# Patient Record
Sex: MALE | Race: WHITE | Employment: STUDENT | URBAN - NONMETROPOLITAN AREA
[De-identification: names, ages, dates, MRNs, and addresses within clinical notes are randomized per-mention and may not be internally consistent; named-entity substitution may affect disease eponyms.]

---

## 2018-05-22 ENCOUNTER — OFFICE VISIT (OUTPATIENT)
Dept: FAMILY MEDICINE CLINIC | Facility: CLINIC | Age: 23
End: 2018-05-22

## 2018-05-22 VITALS
SYSTOLIC BLOOD PRESSURE: 112 MMHG | TEMPERATURE: 98 F | OXYGEN SATURATION: 98 % | BODY MASS INDEX: 27.47 KG/M2 | HEART RATE: 85 BPM | DIASTOLIC BLOOD PRESSURE: 84 MMHG | HEIGHT: 72.5 IN | WEIGHT: 205 LBS

## 2018-05-22 DIAGNOSIS — L23.7 ALLERGIC CONTACT DERMATITIS DUE TO PLANTS, EXCEPT FOOD: Primary | ICD-10-CM

## 2018-05-22 PROCEDURE — 99213 OFFICE O/P EST LOW 20 MIN: CPT | Performed by: FAMILY MEDICINE

## 2018-05-22 RX ORDER — PREDNISONE 20 MG/1
20 TABLET ORAL 2 TIMES DAILY
Qty: 14 TABLET | Refills: 0 | Status: SHIPPED | OUTPATIENT
Start: 2018-05-22 | End: 2018-05-29

## 2018-05-22 NOTE — PROGRESS NOTES
Krystyna Langston is a 26year old male. Patient presents with: Other: rash arms inrm 1      HPI:   After weed eating, patient developed a rash to both arms just on the sun exposed area. It is  vesicular but very minimally pruritic.     No current outpatien placed in this encounter. Meds & Refills for this Visit:  Signed Prescriptions Disp Refills    predniSONE 20 MG Oral Tab 14 tablet 0      Sig: Take 1 tablet (20 mg total) by mouth 2 (two) times daily.            Imaging & Consults:  None

## 2018-05-29 ENCOUNTER — OFFICE VISIT (OUTPATIENT)
Dept: FAMILY MEDICINE CLINIC | Facility: CLINIC | Age: 23
End: 2018-05-29

## 2018-05-29 VITALS
WEIGHT: 205.5 LBS | HEART RATE: 87 BPM | SYSTOLIC BLOOD PRESSURE: 118 MMHG | HEIGHT: 72.5 IN | BODY MASS INDEX: 27.53 KG/M2 | OXYGEN SATURATION: 97 % | TEMPERATURE: 98 F | DIASTOLIC BLOOD PRESSURE: 70 MMHG

## 2018-05-29 DIAGNOSIS — L08.9 PUSTULE: Primary | ICD-10-CM

## 2018-05-29 PROCEDURE — 87205 SMEAR GRAM STAIN: CPT | Performed by: FAMILY MEDICINE

## 2018-05-29 PROCEDURE — 87147 CULTURE TYPE IMMUNOLOGIC: CPT | Performed by: FAMILY MEDICINE

## 2018-05-29 PROCEDURE — 99213 OFFICE O/P EST LOW 20 MIN: CPT | Performed by: FAMILY MEDICINE

## 2018-05-29 PROCEDURE — 87186 SC STD MICRODIL/AGAR DIL: CPT | Performed by: FAMILY MEDICINE

## 2018-05-29 PROCEDURE — 87070 CULTURE OTHR SPECIMN AEROBIC: CPT | Performed by: FAMILY MEDICINE

## 2018-05-29 RX ORDER — SULFAMETHOXAZOLE AND TRIMETHOPRIM 800; 160 MG/1; MG/1
1 TABLET ORAL 2 TIMES DAILY
Qty: 28 TABLET | Refills: 0 | Status: SHIPPED | OUTPATIENT
Start: 2018-05-29 | End: 2020-07-23

## 2018-05-29 RX ORDER — MUPIROCIN CALCIUM 20 MG/G
1 CREAM TOPICAL 3 TIMES DAILY
Qty: 30 G | Refills: 1 | Status: SHIPPED | OUTPATIENT
Start: 2018-05-29 | End: 2018-05-30

## 2018-05-29 NOTE — PROGRESS NOTES
Angelica Mills is a 26year old male. Patient presents with: Other: rash - left leg  now. .. inrm 1      HPI:   Patient was seen last week with poison ivy. He is also had multiple small abrasions to his lower legs from weed eating.   Bowels are starting t Placed This Encounter      Aerobic Bacterial Culture [E]    Meds & Refills for this Visit:  Signed Prescriptions Disp Refills    Sulfamethoxazole-TMP DS (BACTRIM DS) 800-160 MG Oral Tab per tablet 28 tablet 0      Sig: Take 1 tablet by mouth 2 (two) times

## 2018-05-30 ENCOUNTER — TELEPHONE (OUTPATIENT)
Dept: FAMILY MEDICINE CLINIC | Facility: CLINIC | Age: 23
End: 2018-05-30

## 2018-05-30 NOTE — TELEPHONE ENCOUNTER
Pt needs to have script changed for the cream or ointment, whichever is cheapest. [facility] and what was sent over is over $400. Pt does not have insurance.

## 2018-08-02 ENCOUNTER — TELEPHONE (OUTPATIENT)
Dept: FAMILY MEDICINE CLINIC | Facility: CLINIC | Age: 23
End: 2018-08-02

## 2018-08-02 NOTE — TELEPHONE ENCOUNTER
HEADDING TO ER NOW IN [place], NEEDS NAME OF ANTIBIOTIC PRESCRIBED LAST TIME HE HAD MRSA   PLEASE CALL MOM JOHN

## 2018-08-02 NOTE — TELEPHONE ENCOUNTER
Mother informed pt had previously been on Bactrim DS and the culture from this same visit was sensitive to this anitbiotic. Denies further questions or concerns.

## 2018-08-02 NOTE — TELEPHONE ENCOUNTER
HX OF MRSA, HAS SOME SUSPICIOUS SORES ASKING IF DR [facility] OR ON CALL PROVIDER WOULD CALL IN ANTIBIOTICS WITHOUT APPOINTMENT SINCE HE IS IN [place] ON VACATION. MOM SAID THEY WILL HEAD OUT TO A WALK IN CLINIC IN [place] ALSO.  PLEASE ADVISE

## 2018-08-06 ENCOUNTER — OFFICE VISIT (OUTPATIENT)
Dept: FAMILY MEDICINE CLINIC | Facility: CLINIC | Age: 23
End: 2018-08-06

## 2018-08-06 VITALS
TEMPERATURE: 98 F | DIASTOLIC BLOOD PRESSURE: 80 MMHG | SYSTOLIC BLOOD PRESSURE: 126 MMHG | HEART RATE: 80 BPM | BODY MASS INDEX: 27 KG/M2 | WEIGHT: 201.63 LBS | RESPIRATION RATE: 16 BRPM

## 2018-08-06 DIAGNOSIS — Z02.9 ADMINISTRATIVE ENCOUNTER: Primary | ICD-10-CM

## 2018-08-07 NOTE — PROGRESS NOTES
Patient is here for his mom for swelling and redness of his left lower leg. States he was in [place] and got possible bug bites. States he has a history of MRSA. His leg has gotten more swollen and red since then. Swelling and redness are spreading.   H

## 2019-11-29 ENCOUNTER — TELEPHONE (OUTPATIENT)
Dept: FAMILY MEDICINE CLINIC | Facility: CLINIC | Age: 24
End: 2019-11-29

## 2019-11-29 NOTE — TELEPHONE ENCOUNTER
I called Tod Jordan and advised that he will need to see someone where he is at to confirm if it MRSA. Typically, a culture needs to be obtained. She was not happy- she said that he has been dealing with this for years and he knows what it is.  She was hoping

## 2019-11-29 NOTE — TELEPHONE ENCOUNTER
OUT OF STATE AND HE HAS ANOTHER SPOT POSSIBLY MRSA AGAIN?         HAS NOT BEEN SEEN SINCE AUGUST 2018. PLEASE ADVISE     ASKING FOR SCRIPT WITHOUT APPT.      [address] [facility] PH[facility] [phone]

## 2020-01-08 ENCOUNTER — OFFICE VISIT (OUTPATIENT)
Dept: FAMILY MEDICINE CLINIC | Facility: CLINIC | Age: 25
End: 2020-01-08
Payer: COMMERCIAL

## 2020-01-08 VITALS
SYSTOLIC BLOOD PRESSURE: 120 MMHG | RESPIRATION RATE: 16 BRPM | DIASTOLIC BLOOD PRESSURE: 80 MMHG | HEART RATE: 80 BPM | TEMPERATURE: 98 F | BODY MASS INDEX: 23.27 KG/M2 | WEIGHT: 179.38 LBS | HEIGHT: 73.5 IN | OXYGEN SATURATION: 98 %

## 2020-01-08 DIAGNOSIS — Z00.00 ROUTINE GENERAL MEDICAL EXAMINATION AT A HEALTH CARE FACILITY: Primary | ICD-10-CM

## 2020-01-08 DIAGNOSIS — Z02.1 PHYSICAL EXAM, PRE-EMPLOYMENT: ICD-10-CM

## 2020-01-08 LAB — INDURATION (): 0 MM (ref 0–11)

## 2020-01-08 PROCEDURE — 86580 TB INTRADERMAL TEST: CPT | Performed by: FAMILY MEDICINE

## 2020-01-08 PROCEDURE — 99395 PREV VISIT EST AGE 18-39: CPT | Performed by: FAMILY MEDICINE

## 2020-01-08 NOTE — PROGRESS NOTES
Clifford Venegas is a 27year old male. Patient presents with:  CPX: annual physical-needs statement of good health for job and TB test.... 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   No current outpatient medications on file prior or ordered in this encounter       Imaging & Consults:  None

## 2020-01-10 ENCOUNTER — NURSE ONLY (OUTPATIENT)
Dept: FAMILY MEDICINE CLINIC | Facility: CLINIC | Age: 25
End: 2020-01-10
Payer: COMMERCIAL

## 2020-01-10 DIAGNOSIS — Z11.1 ENCOUNTER FOR PPD SKIN TEST READING: Primary | ICD-10-CM

## 2020-07-23 ENCOUNTER — OFFICE VISIT (OUTPATIENT)
Dept: FAMILY MEDICINE CLINIC | Facility: CLINIC | Age: 25
End: 2020-07-23
Payer: COMMERCIAL

## 2020-07-23 VITALS
BODY MASS INDEX: 25.55 KG/M2 | HEIGHT: 73.5 IN | DIASTOLIC BLOOD PRESSURE: 68 MMHG | WEIGHT: 197 LBS | OXYGEN SATURATION: 99 % | SYSTOLIC BLOOD PRESSURE: 108 MMHG | HEART RATE: 80 BPM | RESPIRATION RATE: 16 BRPM | TEMPERATURE: 94 F

## 2020-07-23 DIAGNOSIS — L70.0 ACNE VULGARIS: ICD-10-CM

## 2020-07-23 DIAGNOSIS — L08.9 SKIN PUSTULE: Primary | ICD-10-CM

## 2020-07-23 PROCEDURE — 3008F BODY MASS INDEX DOCD: CPT | Performed by: NURSE PRACTITIONER

## 2020-07-23 PROCEDURE — 87205 SMEAR GRAM STAIN: CPT | Performed by: NURSE PRACTITIONER

## 2020-07-23 PROCEDURE — 3078F DIAST BP <80 MM HG: CPT | Performed by: NURSE PRACTITIONER

## 2020-07-23 PROCEDURE — 87077 CULTURE AEROBIC IDENTIFY: CPT | Performed by: NURSE PRACTITIONER

## 2020-07-23 PROCEDURE — 99213 OFFICE O/P EST LOW 20 MIN: CPT | Performed by: NURSE PRACTITIONER

## 2020-07-23 PROCEDURE — 87070 CULTURE OTHR SPECIMN AEROBIC: CPT | Performed by: NURSE PRACTITIONER

## 2020-07-23 PROCEDURE — 3074F SYST BP LT 130 MM HG: CPT | Performed by: NURSE PRACTITIONER

## 2020-07-23 RX ORDER — SULFAMETHOXAZOLE AND TRIMETHOPRIM 800; 160 MG/1; MG/1
1 TABLET ORAL 2 TIMES DAILY
Qty: 28 TABLET | Refills: 0 | Status: SHIPPED | OUTPATIENT
Start: 2020-07-23 | End: 2020-08-06

## 2020-07-23 NOTE — PROGRESS NOTES
HPI:   Patient presents for possible MRSA. States he gets these lesions about the same time every year. Noticed some redness and irritation on his back this morning. Otherwise is feeling well.        Current Outpatient Medications   Medication Sig Dispens Diagnoses and all orders for this visit:    Skin pustule  -     AEROBIC BACTERIAL CULTURE; Future  -     Sulfamethoxazole-TMP DS (BACTRIM DS) 800-160 MG Oral Tab per tablet;  Take 1 tablet by mouth 2 (two) times daily for 14 days.  -     mupirocin 2 % Exter

## 2020-09-21 ENCOUNTER — TELEMEDICINE (OUTPATIENT)
Dept: FAMILY MEDICINE CLINIC | Facility: CLINIC | Age: 25
End: 2020-09-21
Payer: COMMERCIAL

## 2020-09-21 ENCOUNTER — TELEPHONE (OUTPATIENT)
Dept: FAMILY MEDICINE CLINIC | Facility: CLINIC | Age: 25
End: 2020-09-21

## 2020-09-21 DIAGNOSIS — Z20.822 SUSPECTED COVID-19 VIRUS INFECTION: Primary | ICD-10-CM

## 2020-09-21 PROCEDURE — 99213 OFFICE O/P EST LOW 20 MIN: CPT | Performed by: FAMILY MEDICINE

## 2020-09-21 NOTE — PROGRESS NOTES
Telemedicine Visit    Riky Elizabeth  verbally consents to a Telemedicine service on 9/21/2020 . Patient understands and accepts financial responsibility for any deductible, co-insurance and/or co-pays associated with this service.       Duration of the se conversational dyspnea, able to finish sentences without loss of breath.     Labs:  Office Visit on 07/23/2020   Component Date Value Ref Range Status   • Aerobic Culture Result 07/23/2020 1+ growth Staphylococcus species, not aureus*  Final   • Aerobic Sme no physical exam could be performed. The patient was advised to call 911 or to go to the ER in case there was an emergency. The patient was also advised of the potential privacy & security concerns related to the telehealth platform.    The patient was ma

## 2020-09-21 NOTE — TELEPHONE ENCOUNTER
Virtual/Telephone Check-In    Erna Chan verbally {consents to a [employer] on 09/21/20. Patient has been referred to the [facility] website at [email or web address] to review the yearly Consent to Treat document.   Patient underst

## 2020-09-22 ENCOUNTER — TELEPHONE (OUTPATIENT)
Dept: FAMILY MEDICINE CLINIC | Facility: CLINIC | Age: 25
End: 2020-09-22

## 2020-09-22 NOTE — TELEPHONE ENCOUNTER
COVID test order in chart. Advised pt to call central scheduling to make the appt since he hasn't heard from them and it's been >24hrs since order was placed. Verbalized understanding.

## 2020-09-22 NOTE — TELEPHONE ENCOUNTER
Where and when does he go for the covid test?  Dr Duncan Ernst told him yesterday that he should know by noon he said

## 2020-09-23 ENCOUNTER — APPOINTMENT (OUTPATIENT)
Dept: LAB | Age: 25
End: 2020-09-23
Attending: FAMILY MEDICINE
Payer: COMMERCIAL

## 2020-09-23 DIAGNOSIS — Z20.822 SUSPECTED COVID-19 VIRUS INFECTION: ICD-10-CM

## 2020-09-26 LAB — SARS-COV-2 RNA RESP QL NAA+PROBE: NOT DETECTED

## 2023-10-04 ENCOUNTER — OFFICE VISIT (OUTPATIENT)
Dept: FAMILY MEDICINE CLINIC | Facility: CLINIC | Age: 28
End: 2023-10-04

## 2023-10-04 VITALS
BODY MASS INDEX: 23.87 KG/M2 | RESPIRATION RATE: 18 BRPM | HEIGHT: 74 IN | WEIGHT: 186 LBS | HEART RATE: 81 BPM | DIASTOLIC BLOOD PRESSURE: 64 MMHG | OXYGEN SATURATION: 100 % | TEMPERATURE: 98 F | SYSTOLIC BLOOD PRESSURE: 108 MMHG

## 2023-10-04 DIAGNOSIS — Z23 IMMUNIZATION DUE: ICD-10-CM

## 2023-10-04 DIAGNOSIS — F41.9 ANXIETY AND DEPRESSION: ICD-10-CM

## 2023-10-04 DIAGNOSIS — Z00.00 WELLNESS EXAMINATION: Primary | ICD-10-CM

## 2023-10-04 DIAGNOSIS — F32.A ANXIETY AND DEPRESSION: ICD-10-CM

## 2023-10-04 PROBLEM — F41.8 ANXIETY WITH DEPRESSION: Status: ACTIVE | Noted: 2023-10-04

## 2023-10-04 PROCEDURE — 99385 PREV VISIT NEW AGE 18-39: CPT

## 2023-10-04 RX ORDER — ESCITALOPRAM OXALATE 10 MG/1
10 TABLET ORAL NIGHTLY
Qty: 30 TABLET | Refills: 1 | Status: SHIPPED | OUTPATIENT
Start: 2023-10-04 | End: 2023-12-03

## 2023-11-06 DIAGNOSIS — F32.A ANXIETY AND DEPRESSION: ICD-10-CM

## 2023-11-06 DIAGNOSIS — F41.9 ANXIETY AND DEPRESSION: ICD-10-CM

## 2023-11-06 RX ORDER — ESCITALOPRAM OXALATE 10 MG/1
10 TABLET ORAL NIGHTLY
Qty: 30 TABLET | Refills: 1 | Status: CANCELLED | OUTPATIENT
Start: 2023-11-06 | End: 2024-01-05

## 2024-04-02 DIAGNOSIS — F41.8 ANXIETY WITH DEPRESSION: ICD-10-CM

## 2024-04-02 RX ORDER — ESCITALOPRAM OXALATE 20 MG/1
20 TABLET ORAL DAILY
Qty: 30 TABLET | Refills: 5 | Status: CANCELLED | OUTPATIENT
Start: 2024-04-02 | End: 2024-09-29

## 2024-04-03 ENCOUNTER — TELEPHONE (OUTPATIENT)
Dept: FAMILY MEDICINE CLINIC | Facility: CLINIC | Age: 29
End: 2024-04-03

## 2024-04-03 ENCOUNTER — PATIENT MESSAGE (OUTPATIENT)
Dept: FAMILY MEDICINE CLINIC | Facility: CLINIC | Age: 29
End: 2024-04-03

## 2024-04-03 NOTE — TELEPHONE ENCOUNTER
Spoke with patient - informed that prescription had 5 refills.    Also contacted pharmacy - pt does have refills left.

## 2024-04-03 NOTE — TELEPHONE ENCOUNTER
From: Daniel J Humes  To: Charla Laura  Sent: 4/3/2024 4:15 PM CDT  Subject: Medication refill    Hey sorry for messaging you with this, but I can’t find anywhere else to message and it doesn’t let you respond to my own refill request, which is weird. Anyways, I am out of the lexipro so last night I requested a refill. I was confused by the message as to why. And now I just can’t request another refill. Can you help me out?

## 2024-09-02 DIAGNOSIS — F41.8 ANXIETY WITH DEPRESSION: ICD-10-CM

## 2024-09-03 ENCOUNTER — TELEPHONE (OUTPATIENT)
Dept: FAMILY MEDICINE CLINIC | Facility: CLINIC | Age: 29
End: 2024-09-03

## 2024-09-03 RX ORDER — ESCITALOPRAM OXALATE 20 MG/1
20 TABLET ORAL DAILY
Qty: 30 TABLET | Refills: 0 | Status: SHIPPED | OUTPATIENT
Start: 2024-09-03 | End: 2024-09-09

## 2024-09-03 NOTE — TELEPHONE ENCOUNTER
Requested Prescriptions     Pending Prescriptions Disp Refills    ESCITALOPRAM 20 MG Oral Tab [Pharmacy Med Name: ESCITALOPRAM 20MG TABLETS] 30 tablet 5     Sig: TAKE 1 TABLET(20 MG) BY MOUTH DAILY     Last refill 3/7/24 #30 x 5   LOV 3/7/24  No future appointments.  Patient is due for 6 month follow up this month - reminder letter sent to patient  #30 pended for approval